# Patient Record
Sex: MALE | Race: AMERICAN INDIAN OR ALASKA NATIVE | NOT HISPANIC OR LATINO | Employment: FULL TIME | URBAN - METROPOLITAN AREA
[De-identification: names, ages, dates, MRNs, and addresses within clinical notes are randomized per-mention and may not be internally consistent; named-entity substitution may affect disease eponyms.]

---

## 2018-08-27 ENCOUNTER — HOSPITAL ENCOUNTER (OUTPATIENT)
Facility: HOSPITAL | Age: 48
Setting detail: OBSERVATION
Discharge: HOME | End: 2018-08-28
Attending: EMERGENCY MEDICINE | Admitting: STUDENT IN AN ORGANIZED HEALTH CARE EDUCATION/TRAINING PROGRAM

## 2018-08-27 DIAGNOSIS — F17.200 NICOTINE DEPENDENCE: ICD-10-CM

## 2018-08-27 DIAGNOSIS — R55 NEAR SYNCOPE: Primary | ICD-10-CM

## 2018-08-27 DIAGNOSIS — R53.1 GENERALIZED WEAKNESS: ICD-10-CM

## 2018-08-27 LAB
BASOPHILS # BLD AUTO: 0.05 THOUSANDS/ΜL (ref 0–0.1)
BASOPHILS NFR BLD AUTO: 0 % (ref 0–1)
EOSINOPHIL # BLD AUTO: 0.32 THOUSAND/ΜL (ref 0–0.61)
EOSINOPHIL NFR BLD AUTO: 2 % (ref 0–6)
ERYTHROCYTE [DISTWIDTH] IN BLOOD BY AUTOMATED COUNT: 14.1 % (ref 11.6–15.1)
GLUCOSE SERPL-MCNC: 85 MG/DL (ref 65–140)
HCT VFR BLD AUTO: 38.7 % (ref 36.5–49.3)
HGB BLD-MCNC: 12.7 G/DL (ref 12–17)
IMM GRANULOCYTES # BLD AUTO: 0.06 THOUSAND/UL (ref 0–0.2)
IMM GRANULOCYTES NFR BLD AUTO: 0 % (ref 0–2)
LYMPHOCYTES # BLD AUTO: 3.96 THOUSANDS/ΜL (ref 0.6–4.47)
LYMPHOCYTES NFR BLD AUTO: 25 % (ref 14–44)
MCH RBC QN AUTO: 28.5 PG (ref 26.8–34.3)
MCHC RBC AUTO-ENTMCNC: 32.8 G/DL (ref 31.4–37.4)
MCV RBC AUTO: 87 FL (ref 82–98)
MONOCYTES # BLD AUTO: 1.07 THOUSAND/ΜL (ref 0.17–1.22)
MONOCYTES NFR BLD AUTO: 7 % (ref 4–12)
NEUTROPHILS # BLD AUTO: 10.49 THOUSANDS/ΜL (ref 1.85–7.62)
NEUTS SEG NFR BLD AUTO: 66 % (ref 43–75)
NRBC BLD AUTO-RTO: 0 /100 WBCS
PLATELET # BLD AUTO: 293 THOUSANDS/UL (ref 149–390)
PMV BLD AUTO: 9.5 FL (ref 8.9–12.7)
RBC # BLD AUTO: 4.45 MILLION/UL (ref 3.88–5.62)
WBC # BLD AUTO: 15.95 THOUSAND/UL (ref 4.31–10.16)

## 2018-08-27 PROCEDURE — 80053 COMPREHEN METABOLIC PANEL: CPT | Performed by: EMERGENCY MEDICINE

## 2018-08-27 PROCEDURE — 82550 ASSAY OF CK (CPK): CPT | Performed by: EMERGENCY MEDICINE

## 2018-08-27 PROCEDURE — 84100 ASSAY OF PHOSPHORUS: CPT | Performed by: EMERGENCY MEDICINE

## 2018-08-27 PROCEDURE — 83735 ASSAY OF MAGNESIUM: CPT | Performed by: EMERGENCY MEDICINE

## 2018-08-27 PROCEDURE — 83615 LACTATE (LD) (LDH) ENZYME: CPT | Performed by: EMERGENCY MEDICINE

## 2018-08-27 PROCEDURE — 84484 ASSAY OF TROPONIN QUANT: CPT | Performed by: EMERGENCY MEDICINE

## 2018-08-27 PROCEDURE — 93005 ELECTROCARDIOGRAM TRACING: CPT

## 2018-08-27 PROCEDURE — 82948 REAGENT STRIP/BLOOD GLUCOSE: CPT

## 2018-08-27 PROCEDURE — 36415 COLL VENOUS BLD VENIPUNCTURE: CPT

## 2018-08-27 PROCEDURE — 85025 COMPLETE CBC W/AUTO DIFF WBC: CPT | Performed by: EMERGENCY MEDICINE

## 2018-08-27 PROCEDURE — 83625 ASSAY OF LDH ENZYMES: CPT | Performed by: EMERGENCY MEDICINE

## 2018-08-27 NOTE — LETTER
[address] [facility] [address] [id number]  Dept: [phone]    August 28, 2018     Patient: Vishnu Spain   YOB: 1970   Date of Visit: 8/27/2018       To Whom it May Concern:    Vishnu Spain was seen in the hospital from 8/27/2018  to 08/28/18  If you have any questions or concerns, please don't hesitate to call           Sincerely,          Rene Bernardo MD

## 2018-08-28 ENCOUNTER — APPOINTMENT (EMERGENCY)
Dept: RADIOLOGY | Facility: HOSPITAL | Age: 48
End: 2018-08-28

## 2018-08-28 ENCOUNTER — APPOINTMENT (OUTPATIENT)
Dept: NON INVASIVE DIAGNOSTICS | Facility: HOSPITAL | Age: 48
End: 2018-08-28

## 2018-08-28 ENCOUNTER — APPOINTMENT (OUTPATIENT)
Dept: RADIOLOGY | Facility: HOSPITAL | Age: 48
End: 2018-08-28

## 2018-08-28 VITALS
DIASTOLIC BLOOD PRESSURE: 66 MMHG | HEIGHT: 73 IN | TEMPERATURE: 98.5 F | BODY MASS INDEX: 22.76 KG/M2 | RESPIRATION RATE: 18 BRPM | WEIGHT: 171.74 LBS | OXYGEN SATURATION: 98 % | SYSTOLIC BLOOD PRESSURE: 117 MMHG | HEART RATE: 65 BPM

## 2018-08-28 PROBLEM — R55 NEAR SYNCOPE: Status: RESOLVED | Noted: 2018-08-28 | Resolved: 2018-08-28

## 2018-08-28 PROBLEM — R55 SYNCOPE: Status: ACTIVE | Noted: 2018-08-28

## 2018-08-28 PROBLEM — D72.829 LEUKOCYTOSIS: Status: ACTIVE | Noted: 2018-08-28

## 2018-08-28 PROBLEM — A08.4 VIRAL GASTROENTERITIS: Status: RESOLVED | Noted: 2018-08-28 | Resolved: 2018-08-28

## 2018-08-28 PROBLEM — E11.9 DM (DIABETES MELLITUS) (HCC): Status: ACTIVE | Noted: 2018-08-28

## 2018-08-28 PROBLEM — A08.4 VIRAL GASTROENTERITIS: Status: ACTIVE | Noted: 2018-08-28

## 2018-08-28 LAB
ALBUMIN SERPL BCP-MCNC: 3.7 G/DL (ref 3.5–5)
ALP SERPL-CCNC: 66 U/L (ref 46–116)
ALT SERPL W P-5'-P-CCNC: 19 U/L (ref 12–78)
AMPHETAMINES SERPL QL SCN: NEGATIVE
ANION GAP SERPL CALCULATED.3IONS-SCNC: 5 MMOL/L (ref 4–13)
ANION GAP SERPL CALCULATED.3IONS-SCNC: 5 MMOL/L (ref 4–13)
AST SERPL W P-5'-P-CCNC: 15 U/L (ref 5–45)
ATRIAL RATE: 70 BPM
BARBITURATES UR QL: NEGATIVE
BASOPHILS # BLD AUTO: 0.05 THOUSANDS/ΜL (ref 0–0.1)
BASOPHILS NFR BLD AUTO: 0 % (ref 0–1)
BENZODIAZ UR QL: NEGATIVE
BILIRUB SERPL-MCNC: 0.3 MG/DL (ref 0.2–1)
BILIRUB UR QL STRIP: NEGATIVE
BUN SERPL-MCNC: 11 MG/DL (ref 5–25)
BUN SERPL-MCNC: 9 MG/DL (ref 5–25)
CALCIUM SERPL-MCNC: 8.8 MG/DL (ref 8.3–10.1)
CALCIUM SERPL-MCNC: 9.1 MG/DL (ref 8.3–10.1)
CHEST PAIN STATEMENT: NORMAL
CHLORIDE SERPL-SCNC: 102 MMOL/L (ref 100–108)
CHLORIDE SERPL-SCNC: 103 MMOL/L (ref 100–108)
CHOLEST SERPL-MCNC: 76 MG/DL (ref 50–200)
CK SERPL-CCNC: 78 U/L (ref 39–308)
CLARITY UR: CLEAR
CO2 SERPL-SCNC: 31 MMOL/L (ref 21–32)
CO2 SERPL-SCNC: 32 MMOL/L (ref 21–32)
COCAINE UR QL: NEGATIVE
COLOR UR: YELLOW
CREAT SERPL-MCNC: 0.83 MG/DL (ref 0.6–1.3)
CREAT SERPL-MCNC: 0.88 MG/DL (ref 0.6–1.3)
EOSINOPHIL # BLD AUTO: 0.3 THOUSAND/ΜL (ref 0–0.61)
EOSINOPHIL NFR BLD AUTO: 2 % (ref 0–6)
ERYTHROCYTE [DISTWIDTH] IN BLOOD BY AUTOMATED COUNT: 14.3 % (ref 11.6–15.1)
ETHANOL SERPL-MCNC: <3 MG/DL (ref 0–3)
GFR SERPL CREATININE-BSD FRML MDRD: 102 ML/MIN/1.73SQ M
GFR SERPL CREATININE-BSD FRML MDRD: 105 ML/MIN/1.73SQ M
GLUCOSE P FAST SERPL-MCNC: 83 MG/DL (ref 65–99)
GLUCOSE SERPL-MCNC: 83 MG/DL (ref 65–140)
GLUCOSE SERPL-MCNC: 87 MG/DL (ref 65–140)
GLUCOSE SERPL-MCNC: 95 MG/DL (ref 65–140)
GLUCOSE SERPL-MCNC: 95 MG/DL (ref 65–140)
GLUCOSE SERPL-MCNC: 96 MG/DL (ref 65–140)
GLUCOSE UR STRIP-MCNC: NEGATIVE MG/DL
HCT VFR BLD AUTO: 39.7 % (ref 36.5–49.3)
HDLC SERPL-MCNC: 26 MG/DL (ref 40–60)
HGB BLD-MCNC: 12.7 G/DL (ref 12–17)
HGB UR QL STRIP.AUTO: NEGATIVE
IMM GRANULOCYTES # BLD AUTO: 0.07 THOUSAND/UL (ref 0–0.2)
IMM GRANULOCYTES NFR BLD AUTO: 0 % (ref 0–2)
KETONES UR STRIP-MCNC: NEGATIVE MG/DL
LDLC SERPL CALC-MCNC: 42 MG/DL (ref 0–100)
LEUKOCYTE ESTERASE UR QL STRIP: NEGATIVE
LYMPHOCYTES # BLD AUTO: 2.85 THOUSANDS/ΜL (ref 0.6–4.47)
LYMPHOCYTES NFR BLD AUTO: 18 % (ref 14–44)
MAGNESIUM SERPL-MCNC: 2 MG/DL (ref 1.6–2.6)
MAX DIASTOLIC BP: 82 MMHG
MAX HEART RATE: 144 BPM
MAX PREDICTED HEART RATE: 173 BPM
MAX. SYSTOLIC BP: 168 MMHG
MCH RBC QN AUTO: 28.1 PG (ref 26.8–34.3)
MCHC RBC AUTO-ENTMCNC: 32 G/DL (ref 31.4–37.4)
MCV RBC AUTO: 88 FL (ref 82–98)
METHADONE UR QL: NEGATIVE
MONOCYTES # BLD AUTO: 1.07 THOUSAND/ΜL (ref 0.17–1.22)
MONOCYTES NFR BLD AUTO: 7 % (ref 4–12)
NEUTROPHILS # BLD AUTO: 11.65 THOUSANDS/ΜL (ref 1.85–7.62)
NEUTS SEG NFR BLD AUTO: 73 % (ref 43–75)
NITRITE UR QL STRIP: NEGATIVE
NONHDLC SERPL-MCNC: 50 MG/DL
NRBC BLD AUTO-RTO: 0 /100 WBCS
OPIATES UR QL SCN: NEGATIVE
P AXIS: 24 DEGREES
PCP UR QL: NEGATIVE
PH UR STRIP.AUTO: 6.5 [PH] (ref 5–9)
PHOSPHATE SERPL-MCNC: 4.4 MG/DL (ref 2.7–4.5)
PLATELET # BLD AUTO: 284 THOUSANDS/UL (ref 149–390)
PMV BLD AUTO: 9.7 FL (ref 8.9–12.7)
POTASSIUM SERPL-SCNC: 3.6 MMOL/L (ref 3.5–5.3)
POTASSIUM SERPL-SCNC: 3.8 MMOL/L (ref 3.5–5.3)
PR INTERVAL: 216 MS
PROT SERPL-MCNC: 7.2 G/DL (ref 6.4–8.2)
PROT UR STRIP-MCNC: NEGATIVE MG/DL
PROTOCOL NAME: NORMAL
QRS AXIS: 16 DEGREES
QRSD INTERVAL: 86 MS
QT INTERVAL: 380 MS
QTC INTERVAL: 410 MS
RBC # BLD AUTO: 4.52 MILLION/UL (ref 3.88–5.62)
SODIUM SERPL-SCNC: 138 MMOL/L (ref 136–145)
SODIUM SERPL-SCNC: 140 MMOL/L (ref 136–145)
SP GR UR STRIP.AUTO: 1.02 (ref 1–1.03)
T WAVE AXIS: 32 DEGREES
TARGET HR FORMULA: NORMAL
TEST INDICATION: NORMAL
THC UR QL: POSITIVE
TIME IN EXERCISE PHASE: NORMAL
TRIGL SERPL-MCNC: 40 MG/DL
TROPONIN I SERPL-MCNC: <0.02 NG/ML
TSH SERPL DL<=0.05 MIU/L-ACNC: 0.89 UIU/ML (ref 0.36–3.74)
UROBILINOGEN UR QL STRIP.AUTO: 0.2 E.U./DL
VENTRICULAR RATE: 70 BPM
WBC # BLD AUTO: 15.99 THOUSAND/UL (ref 4.31–10.16)

## 2018-08-28 PROCEDURE — 87081 CULTURE SCREEN ONLY: CPT | Performed by: STUDENT IN AN ORGANIZED HEALTH CARE EDUCATION/TRAINING PROGRAM

## 2018-08-28 PROCEDURE — 70450 CT HEAD/BRAIN W/O DYE: CPT

## 2018-08-28 PROCEDURE — 99245 OFF/OP CONSLTJ NEW/EST HI 55: CPT | Performed by: INTERNAL MEDICINE

## 2018-08-28 PROCEDURE — 99219 PR INITIAL OBSERVATION CARE/DAY 50 MINUTES: CPT | Performed by: STUDENT IN AN ORGANIZED HEALTH CARE EDUCATION/TRAINING PROGRAM

## 2018-08-28 PROCEDURE — 80320 DRUG SCREEN QUANTALCOHOLS: CPT | Performed by: EMERGENCY MEDICINE

## 2018-08-28 PROCEDURE — 99285 EMERGENCY DEPT VISIT HI MDM: CPT

## 2018-08-28 PROCEDURE — 85025 COMPLETE CBC W/AUTO DIFF WBC: CPT | Performed by: STUDENT IN AN ORGANIZED HEALTH CARE EDUCATION/TRAINING PROGRAM

## 2018-08-28 PROCEDURE — 93017 CV STRESS TEST TRACING ONLY: CPT

## 2018-08-28 PROCEDURE — 36415 COLL VENOUS BLD VENIPUNCTURE: CPT | Performed by: EMERGENCY MEDICINE

## 2018-08-28 PROCEDURE — 70498 CT ANGIOGRAPHY NECK: CPT

## 2018-08-28 PROCEDURE — 82948 REAGENT STRIP/BLOOD GLUCOSE: CPT

## 2018-08-28 PROCEDURE — 80307 DRUG TEST PRSMV CHEM ANLYZR: CPT | Performed by: EMERGENCY MEDICINE

## 2018-08-28 PROCEDURE — 71046 X-RAY EXAM CHEST 2 VIEWS: CPT

## 2018-08-28 PROCEDURE — 93010 ELECTROCARDIOGRAM REPORT: CPT | Performed by: INTERNAL MEDICINE

## 2018-08-28 PROCEDURE — 84484 ASSAY OF TROPONIN QUANT: CPT | Performed by: STUDENT IN AN ORGANIZED HEALTH CARE EDUCATION/TRAINING PROGRAM

## 2018-08-28 PROCEDURE — 93306 TTE W/DOPPLER COMPLETE: CPT | Performed by: INTERNAL MEDICINE

## 2018-08-28 PROCEDURE — 80048 BASIC METABOLIC PNL TOTAL CA: CPT | Performed by: STUDENT IN AN ORGANIZED HEALTH CARE EDUCATION/TRAINING PROGRAM

## 2018-08-28 PROCEDURE — 84484 ASSAY OF TROPONIN QUANT: CPT | Performed by: EMERGENCY MEDICINE

## 2018-08-28 PROCEDURE — 93306 TTE W/DOPPLER COMPLETE: CPT

## 2018-08-28 PROCEDURE — 93018 CV STRESS TEST I&R ONLY: CPT | Performed by: INTERNAL MEDICINE

## 2018-08-28 PROCEDURE — 99217 PR OBSERVATION CARE DISCHARGE MANAGEMENT: CPT | Performed by: INTERNAL MEDICINE

## 2018-08-28 PROCEDURE — 84443 ASSAY THYROID STIM HORMONE: CPT | Performed by: INTERNAL MEDICINE

## 2018-08-28 PROCEDURE — 70496 CT ANGIOGRAPHY HEAD: CPT

## 2018-08-28 PROCEDURE — 99245 OFF/OP CONSLTJ NEW/EST HI 55: CPT | Performed by: PSYCHIATRY & NEUROLOGY

## 2018-08-28 PROCEDURE — 93016 CV STRESS TEST SUPVJ ONLY: CPT | Performed by: INTERNAL MEDICINE

## 2018-08-28 PROCEDURE — 80061 LIPID PANEL: CPT | Performed by: INTERNAL MEDICINE

## 2018-08-28 PROCEDURE — 81003 URINALYSIS AUTO W/O SCOPE: CPT | Performed by: EMERGENCY MEDICINE

## 2018-08-28 RX ORDER — HEPARIN SODIUM 5000 [USP'U]/ML
5000 INJECTION, SOLUTION INTRAVENOUS; SUBCUTANEOUS EVERY 8 HOURS SCHEDULED
Status: DISCONTINUED | OUTPATIENT
Start: 2018-08-28 | End: 2018-08-28 | Stop reason: HOSPADM

## 2018-08-28 RX ORDER — SODIUM CHLORIDE 9 MG/ML
50 INJECTION, SOLUTION INTRAVENOUS CONTINUOUS
Status: DISCONTINUED | OUTPATIENT
Start: 2018-08-28 | End: 2018-08-28

## 2018-08-28 RX ORDER — SODIUM CHLORIDE 9 MG/ML
100 INJECTION, SOLUTION INTRAVENOUS CONTINUOUS
Status: DISCONTINUED | OUTPATIENT
Start: 2018-08-28 | End: 2018-08-28 | Stop reason: HOSPADM

## 2018-08-28 RX ADMIN — IOHEXOL 85 ML: 350 INJECTION, SOLUTION INTRAVENOUS at 01:36

## 2018-08-28 RX ADMIN — HEPARIN SODIUM 5000 UNITS: 5000 INJECTION, SOLUTION INTRAVENOUS; SUBCUTANEOUS at 13:24

## 2018-08-28 RX ADMIN — SODIUM CHLORIDE 50 ML/HR: 0.9 INJECTION, SOLUTION INTRAVENOUS at 02:11

## 2018-08-28 RX ADMIN — HEPARIN SODIUM 5000 UNITS: 5000 INJECTION, SOLUTION INTRAVENOUS; SUBCUTANEOUS at 05:35

## 2018-08-28 RX ADMIN — SODIUM CHLORIDE 100 ML/HR: 0.9 INJECTION, SOLUTION INTRAVENOUS at 09:04

## 2018-08-28 NOTE — H&P
History and Physical - [facility] Internal Medicine    Patient Information: Francis Parra 53 y o  male MRN: [id number]  Unit/Bed#: ED 08 Encounter: [id number]  Admitting Physician: Damián Major MD  PCP: No primary care provider on file  Date of Admission:  08/28/18    Chief Complaint:     Dizziness, near syncope, abdominal pain, diarrhea   of Present Illness:    Francis Parra is a 53 y o  male with a PMH of DM who presents with dizziness, abdominal pain and diarrhea  He states that he was in his usual state of health until 2 days ago when he noticed some dizziness and nausea  This became more severe and he wound up falling down but not losing consciousness  He states that his body "just gave out"  This has never happened before  This was witnessed by his family who denied any seizure-like activity  Afterwards he regained his strength however he began to have midabdominal discomfort and loose stools  He also continued to have intermittent dizziness  Today he states that the dizziness became much worse and almost passed out  He denies any focal weakness/numbness/tingling, slurred speech or difficulty talking  Currently he reports mild abdominal discomfort  No other complaints or concerns  Review of Systems:    Review of Systems   Constitutional: Negative for fever  Respiratory: Negative for shortness of breath  Cardiovascular: Negative for chest pain and leg swelling  Gastrointestinal: Positive for abdominal pain, diarrhea and nausea  Negative for vomiting  Genitourinary: Negative for hematuria  Musculoskeletal: Negative for arthralgias  Neurological: Positive for dizziness and weakness  Negative for facial asymmetry, speech difficulty and numbness         Past Medical and Surgical History:     Past Medical History:   Diagnosis Date    Diabetes mellitus ([facility] [address] )        Past Surgical History:   Procedure Laterality Date    TONSILLECTOMY         Meds/Allergies:    PTA meds:   None Allergies: No Known Allergies  History:     Marital Status: Single   History   Alcohol Use No     History   Smoking Status    Current Every Day Smoker    Packs/day: 0 50   Smokeless Tobacco    Never Used     History   Drug Use    Frequency: 2 0 times per week    Types: Marijuana       Family History: Mother: DM, Pulmonary HTN  Father: DM    Physical Exam:     Vitals:   Blood Pressure: 121/74 (08/28/18 0133)  Pulse: 62 (08/28/18 0133)  Temperature: 98 1 °F (36 7 °C) (08/27/18 2341)  Temp Source: Oral (08/27/18 2341)  Respirations: 18 (08/28/18 0133)  Height: 6' 1" (185 4 cm) (08/27/18 2341)  Weight - Scale: 78 5 kg (173 lb) (08/27/18 2341)  SpO2: 100 % (08/28/18 0133)    Physical Exam:   General: in no acute distress  HEENT: atraumatic, normocephalic  Skin: no jaundice  CVS: RRR, no murmurs appreciated  Lungs: CTAL, no wheezing or rales appreciated  Abdomen: soft, nondistended, bowel sounds normal, nontender upon palpation, no guarding or rebound tenderness  Extremities: no edema, no calf swelling or tenderness  Neuro: alert and oriented x3, normal handgrip strength bilaterally, sensation intact in all extremities, muscle strength 5/5 in all extremities, follows commands  Psych: calm, cooperative      Lab Results: I have personally reviewed pertinent reports  Results from last 7 days  Lab Units 08/27/18  2347   WBC Thousand/uL 15 95*   HEMOGLOBIN g/dL 12 7   HEMATOCRIT % 38 7   PLATELETS Thousands/uL 293   NEUTROS PCT % 66   LYMPHS PCT % 25   MONOS PCT % 7   EOS PCT % 2       Results from last 7 days  Lab Units 08/27/18  2347   SODIUM mmol/L 138   POTASSIUM mmol/L 3 6   CHLORIDE mmol/L 102   CO2 mmol/L 31   BUN mg/dL 11   CREATININE mg/dL 0 88   CALCIUM mg/dL 8 8   ALK PHOS U/L 66   ALT U/L 19   AST U/L 15           Imaging:     Ct Head Without Contrast    Result Date: 8/28/2018  Narrative: CT BRAIN - WITHOUT CONTRAST INDICATION:   Syncope/fainting  COMPARISON:  None   TECHNIQUE:  CT examination of the brain was performed  In addition to axial images, coronal 2D reformatted images were created and submitted for interpretation  Radiation dose length product (DLP) for this visit:  1242 11 mGy-cm   This examination, like all CT scans performed in the [facility], was performed utilizing techniques to minimize radiation dose exposure, including the use of iterative reconstruction and automated exposure control  IMAGE QUALITY:  Diagnostic  FINDINGS: PARENCHYMA:  No intracranial mass, mass effect or midline shift  No CT signs of acute infarction  No acute parenchymal hemorrhage  VENTRICLES AND EXTRA-AXIAL SPACES:  No hydrocephalus or extra-axial collection  VISUALIZED ORBITS AND PARANASAL SINUSES:  No retrobulbar hematoma  No paranasal sinus disease  CALVARIUM AND EXTRACRANIAL SOFT TISSUES:  No acute calvarial fracture, lytic or blastic lesion or soft tissue mass  Impression: No acute intracranial abnormality  Workstation performed: [id number]         Assessment/Plan    * Near syncope   Assessment & Plan    CT head is negative for an acute process  Can be secondary to dehydration from volume loss/diarrhea though initial symptoms began prior to onset but have persisted  Will admit for Observation, monitor on telemetry, trend troponin, check Orthostatics, order TTE and have Cardiology and Neurology evaluate the pt         Viral gastroenteritis   Assessment & Plan    Likely secondary to viral gastroenteritis as he reports mild abdominal discomfort, diarrhea and recent sick contact with similar complaints  Will order stool studies and hydrate gently with IV NS        Leukocytosis   Assessment & Plan    Likely due to above    Will repeat CBC later today         DM (diabetes mellitus) ([facility] [address] )   Assessment & Plan    No results found for: HGBA1C    Recent Labs      08/27/18   2340   POCGLU  85       Blood Sugar Average: Last 72 hrs:  (P) 85     Will place on MICHI ORTEGA [facility] Problem List: Principal Problem:    Near syncope  Active Problems:    Viral gastroenteritis    Leukocytosis    DM (diabetes mellitus) (HCC)        VTE Prophylaxis: Heparin   Code Status: No Order    Anticipated Length of Stay:  Patient will be admitted on an Observation basis with an anticipated length of stay of atleast 1 midnights  Total Time for Visit, including Counseling / Coordination of Care: 30 minutes  Greater than 50% of this total time spent on direct patient counseling and coordination of care

## 2018-08-28 NOTE — DISCHARGE SUMMARY
Discharge Summary - [facility] [facility] Medicine    Patient Information: Vishnu Spain 53 y o  male MRN: [id number]  Unit/Bed#: [address] [address] Encounter: [id number]    Discharging Physician / Practitioner: Rene Bernardo MD  PCP: No primary care provider on file  Admission Date: 8/27/2018  Discharge Date: 08/28/18    Reason for Admission: Weakness - Generalized (PAtient rpeorts generalized weakness and dizziness X 3days  Patient reports that he had a syncopal episode on saturday, patient states that he felt the same way yesterday he sat on the floor "and I went limp but no LOC " Patient states he started to feel the same way today so he decided to come to the ER   )      Discharge Diagnoses:     Principal Problem (Resolved):    Near syncope  Active Problems:    Leukocytosis    DM (diabetes mellitus) ([facility] )  Resolved Problems:    Viral gastroenteritis        DM (diabetes mellitus) ([facility] )   Assessment & Plan    No results found for: HGBA1C    Recent Labs      08/27/18   2340  08/28/18   0710  08/28/18   1142   POCGLU  85  95  95       Blood Sugar Average: Last 72 hrs:  (P) 85     Acceptable  Follow-up PMD        Leukocytosis   Assessment & Plan    Likely reactive related to gastroenteritis   Patient is afebrile hemodynamically stable  Abdominal exam is benign  Tolerating diet  No further nausea vomiting or diarrhea  Advised to monitor symptoms  Advised to follow up with PMD in 1-2 weeks for repeat CBC  Advised to seek medical attention if recurrence of symptoms        Viral gastroenteritisresolved as of 8/28/2018   Assessment & Plan    Possibly secondary to gastroenteritis as he reports mild abdominal discomfort, diarrhea and recent sick contact with similar complaints     Clinically improving  No further nausea vomiting or abdominal discomfort  Tolerating diet  Abdominal exam benign   monitor clinically, supportive care  Advised to seek medical attention if recurrence of GI symptoms          * Near syncoperesolved as of 8/28/2018   Assessment & Plan    CT head is negative for an acute process  Likely secondary secondary to vasovagal in setting of volume depletion  Noted to have low normal blood pressure  No orthostasis at the time of admission, but negative subsequently by after IV hydration  CTA head and neck without any acute abnormality  2D echo with normal EF without significant valvular abnormality  Telemetry is without any arrhythmia  Symptomatic improved  Tolerating diet  Neuro exam nonfocal, abdominal exam benign  Advised adequate p o  intake and hydration  Pineda stockings  Follow-up pending TSH and lipid profile  Neurology and Cardiology input appreciated            Consultations During Hospital Stay:  [address] [facility] TO CARDIOLOGY    Procedures Performed:     · Orthostasis- negative after hydration    · 2D echo  SUMMARY     LEFT VENTRICLE:  Systolic function was normal  Ejection fraction was estimated in the range of 55 % to 60 %  There were no regional wall motion abnormalities  There was mild borderline concentric hypertrophy      MITRAL VALVE:  There was trace regurgitation      TRICUSPID VALVE:  There was mild regurgitation  Estimated peak PA pressure was 25 mmHg    Significant Findings:     · U tox positive for cannabis    Imaging while in hospital:    Cta Head And Neck With And Without Contrast    Result Date: 8/28/2018  Narrative: CTA NECK AND BRAIN WITH AND WITHOUT CONTRAST INDICATION: Headache, sudden, carotid/vertebral dissection suspected COMPARISON:   None  TECHNIQUE:  Routine CT imaging of the Brain without contrast   Post contrast imaging was performed after administration of iodinated contrast through the neck and brain  Post contrast axial 0 625 mm images timed to opacify the arterial system  3D rendering was performed on an independent workstation  MIP reconstructions performed  Coronal reconstructions were performed of the noncontrast portion of the brain    Radiation dose length product (DLP) for this visit:  420 42 mGy-cm   This examination, like all CT scans performed in the [facility], was performed utilizing techniques to minimize radiation dose exposure, including the use of iterative  reconstruction and automated exposure control  IV Contrast:  85 mL of iohexol (OMNIPAQUE)  IMAGE QUALITY:   Diagnostic FINDINGS: Noncontrast CT head was performed prior to this examination and is reported separately CERVICAL VASCULATURE AORTIC ARCH AND GREAT VESSELS:  Normal aortic arch and great vessel origins  Normal visualized subclavian vessels  RIGHT VERTEBRAL ARTERY CERVICAL SEGMENT:  Mild atherosclerotic plaque at the origin  The vessel is normal in caliber throughout the neck  LEFT VERTEBRAL ARTERY CERVICAL SEGMENT:  Left vertebral artery takes off from the aortic arch  The vessel is normal in caliber throughout the neck  RIGHT EXTRACRANIAL CAROTID SEGMENT:  Normal caliber common carotid artery  Normal bifurcation and cervical internal carotid artery  No stenosis or dissection  LEFT EXTRACRANIAL CAROTID SEGMENT:  Normal caliber common carotid artery  Normal bifurcation and cervical internal carotid artery  No stenosis or dissection  NASCET criteria was used to determine the degree of internal carotid artery diameter stenosis  INTRACRANIAL VASCULATURE INTERNAL CAROTID ARTERIES:  Normal enhancement of the intracranial portions of the internal carotid arteries  Normal ophthalmic artery origins  Normal ICA terminus  ANTERIOR CIRCULATION:  Symmetric A1 segments and anterior cerebral arteries with normal enhancement  Normal anterior communicating artery  MIDDLE CEREBRAL ARTERY CIRCULATION:  M1 segment and middle cerebral artery branches demonstrate normal enhancement bilaterally  DISTAL VERTEBRAL ARTERIES:  Normal distal vertebral arteries  Posterior inferior cerebellar artery origins are normal  Normal vertebral basilar junction   BASILAR ARTERY:  Basilar artery is normal in caliber  Normal superior cerebellar arteries  POSTERIOR CEREBRAL ARTERIES: Both posterior cerebral arteries arises from the basilar tip  Both arteries demonstrate normal enhancement  Normal posterior communicating arteries  DURAL VENOUS SINUSES:  Normal  NON VASCULAR ANATOMY BONY STRUCTURES:  No acute osseous abnormality  SOFT TISSUES OF THE NECK:  Mild adenoidal hypertrophy is noted, likely secondary to infectious or inflammatory etiology  THORACIC INLET:  4 mm intrafissural node is incidentally noted at the right upper lobe  Impression: No evidence of large aneurysm, dissection, significant stenosis or large vessel occlusion throughout the head and neck Workstation performed: [id number]     X-ray Chest 2 Views    Result Date: 8/28/2018  Narrative: CHEST INDICATION:   chest pain  COMPARISON:  None EXAM PERFORMED/VIEWS:  XR CHEST PA & LATERAL FINDINGS: Apices not in the field-of-view  Cardiomediastinal silhouette appears unremarkable  The lungs are clear  No pneumothorax or pleural effusion  Osseous structures appear within normal limits for patient age  Impression: Apices poorly visualized  No acute cardiopulmonary disease  Workstation performed: [id number]     Ct Head Without Contrast    Result Date: 8/28/2018  Narrative: CT BRAIN - WITHOUT CONTRAST INDICATION:   Syncope/fainting  COMPARISON:  None  TECHNIQUE:  CT examination of the brain was performed  In addition to axial images, coronal 2D reformatted images were created and submitted for interpretation  Radiation dose length product (DLP) for this visit:  1242 11 mGy-cm   This examination, like all CT scans performed in the [facility], was performed utilizing techniques to minimize radiation dose exposure, including the use of iterative reconstruction and automated exposure control  IMAGE QUALITY:  Diagnostic  FINDINGS: PARENCHYMA:  No intracranial mass, mass effect or midline shift  No CT signs of acute infarction  No acute parenchymal hemorrhage  VENTRICLES AND EXTRA-AXIAL SPACES:  No hydrocephalus or extra-axial collection  VISUALIZED ORBITS AND PARANASAL SINUSES:  No retrobulbar hematoma  No paranasal sinus disease  CALVARIUM AND EXTRACRANIAL SOFT TISSUES:  No acute calvarial fracture, lytic or blastic lesion or soft tissue mass  Impression: No acute intracranial abnormality  Workstation performed: [id number]       Incidental Findings:   · None    Test Results Pending at Discharge (will require follow up):   · As per After Visit Summary     Outpatient Tests Requested:  · CBC and blood pressure monitoring    Complications:  None    Hospital Course:     Byron Ribeiro is a 53 y o  male patient with past medical history of diet-controlled diabetes mellitus type 2  who originally presented to the hospital on 8/27/2018 due to episode of dizziness and near syncope  Patient reported that 2 days ago he noticed to have some abdominal discomfort and while getting up to use the bathroom he got dizzy and almost passed out  She felt generally weak  Subsequently he continued to have GI symptoms with nausea and loose bowel movement  Next day, he continued to have abdominal uneasiness and poor p o  Intake  Patient reported continued dizziness and felt like passing out on standing up  Denied any focal weakness numbness speech or visual abnormality  Due to persistent symptoms he presented to emergency room and admitted for further evaluation workup  Please refer to H&P from earlier today for further information  Patient remained clinically asymptomatic during hospitalization  Patient did have episode of low normal blood pressure  Received IV fluid and no orthostasis was available at the time of presentation but subsequently orthostasis were negative  Patient did not have any further GI symptoms during the hospitalization and tolerated diet  Cardiac workup was done as above  Patient was seen by Cardiology and Neurology  Clinically remained stable and was discharged home  Patient was advised to maintain adequate p o  intake and hydration  Pineda stockings were advised for which patient declined prescription as he reported that he would be able to get it with the help of the family member  Patient was educated the diagnosis treatment and follow-up plan  Patient was advised to seek medical attention in event of recurrence of the symptoms  Please see above list of diagnoses and related plan for additional information  Condition at Discharge: stable     Discharge Day Visit / Exam:     Subjective:  Feels much better  Denies any nausea vomiting abdominal pain or diarrhea  Tolerating diet  Denies any dizziness    Vitals: Blood Pressure: 117/66 (08/28/18 1222)  Pulse: 65 (08/28/18 1222)  Temperature: 98 5 °F (36 9 °C) (08/28/18 1222)  Temp Source: Temporal (08/28/18 1222)  Respirations: 18 (08/28/18 1222)  Height: 6' 1" (185 4 cm) (08/27/18 2341)  Weight - Scale: 77 9 kg (171 lb 11 8 oz) (08/28/18 0154)  SpO2: 98 % (08/28/18 1222)  Exam:   Physical Exam   Constitutional: He is oriented to person, place, and time  He appears well-developed  No distress  HENT:   Head: Normocephalic and atraumatic  Nose: Nose normal    Mouth/Throat: Oropharynx is clear and moist    Eyes: Conjunctivae and EOM are normal  Pupils are equal, round, and reactive to light  Neck: Normal range of motion  Neck supple  No JVD present  Cardiovascular: Normal rate, regular rhythm and normal heart sounds  Exam reveals no gallop and no friction rub  No murmur heard  Pulmonary/Chest: Effort normal and breath sounds normal  No respiratory distress  He has no wheezes  He has no rales  He exhibits no tenderness  Abdominal: Soft  Bowel sounds are normal  He exhibits no distension and no pulsatile midline mass  There is no hepatosplenomegaly  There is no tenderness  There is no rebound, no guarding and no CVA tenderness     Musculoskeletal: Normal range of motion  He exhibits no edema  Neurological: He is alert and oriented to person, place, and time  He has normal strength  He displays normal reflexes  No cranial nerve deficit or sensory deficit  He exhibits normal muscle tone  Coordination normal  GCS eye subscore is 4  GCS verbal subscore is 5  GCS motor subscore is 6  Skin: Skin is warm and dry  No rash noted  Psychiatric: He has a normal mood and affect  Discharge instructions/Information to patient and family:(Discharge Medications and Follow up):   See after visit summary for information provided to patient and family  Provisions for Follow-Up Care:  See after visit summary for information related to follow-up care and any pertinent home health orders  Disposition: Home    Planned Readmission:  No     Discharge Statement:  I spent 50 minutes discharging the patient  This time was spent on the day of discharge  I had direct contact with the patient on the day of discharge  Greater than 50% of the total time was spent examining patient, answering all patient questions, arranging and discussing plan of care with patient as well as directly providing post-discharge instructions  Additional time then spent on discharge activities  Coordinated with Cardiology, Dr Karoline May and Neurology, Dr Garry Grover at the time of discharge  Discharge Medications:  See after visit summary for reconciled discharge medications provided to patient and family  ** Please Note:  Dictation voice to text software may have been used in the creation of this document   **

## 2018-08-28 NOTE — PLAN OF CARE
Problem: DISCHARGE PLANNING - CARE MANAGEMENT  Goal: Discharge to post-acute care or home with appropriate resources  INTERVENTIONS:  - Conduct assessment to determine patient/family and health care team treatment goals, and need for post-acute services based on payer coverage, community resources, and patient preferences, and barriers to discharge  - Address psychosocial, clinical, and financial barriers to discharge as identified in assessment in conjunction with the patient/family and health care team  - Arrange appropriate level of post-acute services according to patient's   needs and preference and payer coverage in collaboration with the physician and health care team  - Communicate with and update the patient/family, physician, and health care team regarding progress on the discharge plan  - Arrange appropriate transportation to post-acute venues  Return to home independently    Outcome: Progressing

## 2018-08-28 NOTE — NURSING NOTE
Received report from [facility], from ED  Patient arrived on unit via wheel chair  Awake and alert  Denies pain  No s/s of distress  Oriented patient to room, unit and staff

## 2018-08-28 NOTE — SOCIAL WORK
DASH discussion completed  Discussed goals of making sure pt's needs are met upon discharge, pt's preferences are taken into account, pt understands her health condition, medications and symptoms to watch for after returning home and pt is aware of any follow up appointments recommended by hospital physician  RENETTA spoke with the pt at the bedside  Pt noted that he lives with a brother and that he is independent with his own care  Pt denies DME or HHC  Pt works nights and not there long enough for insurance yet  He stated that he spoke with [employer] and will follow up with them post DC   Pt planning on using [employer] in [place], [place]

## 2018-08-28 NOTE — PLAN OF CARE
CARDIOVASCULAR - ADULT     Maintains optimal cardiac output and hemodynamic stability Progressing     Absence of cardiac dysrhythmias or at baseline rhythm Progressing        INFECTION - ADULT     Absence or prevention of progression during hospitalization Progressing     Absence of fever/infection during neutropenic period Progressing        Knowledge Deficit     Patient/family/caregiver demonstrates understanding of disease process, treatment plan, medications, and discharge instructions Progressing        METABOLIC, FLUID AND ELECTROLYTES - ADULT     Electrolytes maintained within normal limits Progressing     Fluid balance maintained Progressing     Glucose maintained within target range Progressing        PAIN - ADULT     Verbalizes/displays adequate comfort level or baseline comfort level Progressing        Potential for Falls     Patient will remain free of falls Progressing        SAFETY ADULT     Maintain or return to baseline ADL function Progressing     Maintain or return mobility status to optimal level Progressing

## 2018-08-28 NOTE — CONSULTS
[facility]   Neurology Initial Consult    Marylen Cobbs is a 53 y o  male  [address]          Information obtained from:   Chief Complaint   Patient presents with    Weakness - Generalized     PAtient rpeorts generalized weakness and dizziness X 3days  Patient reports that he had a syncopal episode on saturday, patient states that he felt the same way yesterday he sat on the floor "and I went limp but no LOC " Patient states he started to feel the same way today so he decided to come to the ER  Assessment/Plan:    1  Syncope   2  Dehydration   3  Viral gastroenteritis      Most likely etiology vasovagal from volume depletion in setting of current GI illness and poor intake  Discussed staying hydrated with fluids and electrolytes  His condition should be self limited  Continue IV hydration  If his sxs recur, he is asked to wear compressive stockings  HPI:  Marylen Cobbs is a 54 yo M with PMH of DM II that presents with cc of presyncopal events  First one was Sat night when he was on his way to bathroom  Before he could get to it, he felt like he was going to pass out  His younger brother saw this and caught him from falling down  He did have brief LOC  He did have nausea prior to it  After the incident, he vomited and had loose bowel movement  Sunday, again he was standing one time and felt as if he was going to black out  He feels his body just becomes weak  Yesterday he also felt lightheaded and decided to come for medical evaluation  He says that his job requires a lot of walking  Lately, he has a lot of stress and is not able to find time to eat or drink on time  Upon arrival, he did report GI discomfort  He does have leukocytosis with wbc in range of 15  There was no reported seizure like activity  There was no LOC, bowel, bladder incontinence  There was no associated chest pain, palpitations         Past Medical History:   Diagnosis Date    Diabetes mellitus ([facility] [address] )        Past Surgical History:   Procedure Laterality Date    TONSILLECTOMY         No Known Allergies      Current Facility-Administered Medications:     heparin (porcine) subcutaneous injection 5,000 Units, 5,000 Units, Subcutaneous, Q8H [facility], 5,000 Units at 08/28/18 1324 **AND** Platelet count, , , Once, Cristina Madrid MD    nicotine (NICODERM CQ) 7 mg/24hr TD 24 hr patch 1 patch, 1 patch, Transdermal, Daily, Cristina Madrid MD    sodium chloride 0 9 % infusion, 100 mL/hr, Intravenous, Continuous, Ayse [facility] MD, Last Rate: 100 mL/hr at 08/28/18 0904, 100 mL/hr at 08/28/18 1681    Social History     Social History    Marital status: Single     Spouse name: N/A    Number of children: N/A    Years of education: N/A     Occupational History    Not on file  Social History Main Topics    Smoking status: Current Every Day Smoker     Packs/day: 0 50    Smokeless tobacco: Never Used    Alcohol use No    Drug use: Yes     Frequency: 2 0 times per week     Types: Marijuana    Sexual activity: Not on file     Other Topics Concern    Not on file     Social History Narrative    No narrative on file       History reviewed  No pertinent family history  Review of systems:  Please see HPI for positive symptoms  No fever, no chills, no weight change  Ocular: No drainage, no blurred vision  HEENT:  No sore throat, earache, or congestion  No neck pain  COR:  No chest pain  No palpitations  Lungs:  no sob, wheezing,  GI:  no  nausea, no vomiting, no diarrhea, no constipation, no anorexia  :  No dysuria, frequency, or urgency  No hematuria  Musculoskeletal:  No joint pain or swelling or edema  Skin:  No rash or itching  Psychiatric:  no anxiety, no depression  Endocrine:  No polyuria or polydipsia  Physical examination:  Vitals:    08/28/18 1222   BP: 117/66   Pulse: 65   Resp: 18   Temp: 98 5 °F (36 9 °C)   SpO2: 98%       GENERAL APPEARANCE:  The patient is alert, oriented    He is in no acute distress  HEENT:  Head is normocephalic  The sinuses are otherwise nontender  Pupils are equal and reactive  NECK:  Supple without lymphadenopathy  HEART:  Regular rate and rhythm  LUNGS:  clear to auscultation  No crackles or wheezes are heard  ABDOMEN:  Soft, nontender, nondistended with good bowel sounds heard  EXTREMITIES:  Without cyanosis, clubbing or edema  Mental status: The patient is alert, attentive, and oriented  Speech is clear and fluent, good repetition, comprehension, and naming  he recalls 3/3 objects at 5 minutes  Cranial nerves:  CN II: Visual fields are full to confrontation  Fundoscopic exam is normal with sharp discs and no vascular changes    Pupils are 3 mm and  reactive to light  CN III, IV, VI: At primary gaze, there is no eye deviation  CN V: Facial sensation is intact to pinprick in all 3 divisions bilaterally  Corneal responses are intact  CN VII: Face is symmetric with normal eye closure and smile  CN VIII: Hearing is normal to rubbing fingers  CN IX, X: Palate elevates symmetrically  Phonation is normal   CN XI: Head turning and shoulder shrug are intact  CN XII: Tongue is midline with normal movements and no atrophy  Motor: There is no pronator drift of out-stretched arms  Muscle bulk and tone are normal      Muscle exam  Arm Right Left Leg Right Left   Deltoid 5/5 5/5 Iliopsoas 5/5 5/5   Biceps 5/5 5/5 Quads 5/5 5/5   Triceps 5/5 5/5 Hamstrings 5/5 5/5   Wrist Extension 5/5 5/5 Ankle Dorsi Flexion 5/5 5/5   Wrist Flexion 5/5 5/5 Ankle Plantar Flexion 5/5 5/5   Interossei 5/5 5/5 Ankle Eversion 5/5 5/5   APB 5/5 5/5 Ankle Inversion 5/5 5/5       Reflexes   RJ BJ TJ KJ AJ Plantars Suarez's   Right 2+ 2+ 2+ 2+ 2+ Downgoing Not present   Left 2+ 2+ 2+ 2+ 2+ Downgoing Not present     Sensory:  Light touch, pinprick, position sense, and vibration sense are intact in fingers and toes    Coordination:  Rapid alternating movements and fine finger movements are intact  There is no dysmetria on finger-to-nose and heel-knee-shin  There are no abnormal or extraneous movements  Romberg negative  Gait/Stance:  Normal heel/toe walking and tandem gait  Lab Results   Component Value Date    WBC 15 99 (H) 08/28/2018    HGB 12 7 08/28/2018    HCT 39 7 08/28/2018    MCV 88 08/28/2018     08/28/2018     No results found for: HGBA1C  Lab Results   Component Value Date    ALT 19 08/27/2018    AST 15 08/27/2018    ALKPHOS 66 08/27/2018     Lab Results   Component Value Date    CALCIUM 9 1 08/28/2018     08/28/2018    K 3 8 08/28/2018    CO2 32 08/28/2018     08/28/2018    BUN 9 08/28/2018    CREATININE 0 83 08/28/2018         Radiology          Review of reports and notes reveal:  X-ray Chest 2 Views    Result Date: 8/28/2018  Apices poorly visualized  No acute cardiopulmonary disease  Workstation performed: [id number]     Independent Interpretation of images or specimens:  Cta Head And Neck With And Without Contrast    Result Date: 8/28/2018  No evidence of large aneurysm, dissection, significant stenosis or large vessel occlusion throughout the head and neck Workstation performed: [id number]       Ct Head Without Contrast    Result Date: 8/28/2018  No acute intracranial abnormality  Workstation performed: [id number]             Thank you for this consult  Total time of encounter: 60 min   More than 50% of time was spent in counseling and coordination of care of patient  AMADA [facility]  [facility] [address]  [address]

## 2018-08-28 NOTE — CONSULTS
Consultation - Cardiology   Oumou Tanner 53 y o  male MRN: [id number]  Unit/Bed#: [address] [facility] 414-01 Encounter: [id number]    Assessment/Plan     Assessment:  1  Syncope  2  Leukocytosis  3  Viral gastroenteritis    Plan:  Patient has been admitted to the hospitalist service  1   Check orthostatic vital signs  2   Continue IV hydration with GI workup per primary team  3  Obtain 2D echocardiogram to evaluate cardiac function structure and wall motion and rule out valvular dysfunction  4   Exercise stress tests (non unclear) to evaluate for CardioNet vision  History of Present Illness   Physician Requesting Consult: Tena Stephenson MD  Reason for Consult / Principal Problem:   Syncope  HPI: Oumou Tanner is a 53y o  year old male who presents to the emergency room at [facility] with complaints of 3 day history of generalized weakness, dizziness, no energy and headache  He states initially on Saturday 08/25/2018 in the evening he was getting up to go to the bathroom, while he was walking he suddenly felt lightheaded, dizzy, had abdominal discomfort and tunnel vision  He felt as if he was speaking but he was not making any sense to his family  Patient stated he lost consciousness and awoke on the floor  He managed to crawl into the bathroom where he laid on the floor until he started to feel a little bit better  He then managed to get up and use the facility  He stated since that time he has just felt weak, no energy, with intermittent dizziness  He states once again on Sunday and then on Monday evening he had near syncopal episodes  He denies eating any food that was unusual or maybe eating out at a restaurant  He denied any fevers or chills  He denies any further episodes since admission to the hospital   Orthostatic vital signs were negative  Twelve lead EKG is unremarkable  Labs are also unremarkable except for elevated white count and also urine drug screen positive for THC  Inpatient consult to Cardiology  Consult performed by: Sai Baig ordered by: Lyndsey Moreno          Review of Systems   Constitutional: Positive for fatigue  Negative for activity change, appetite change and unexpected weight change  HENT: Positive for tinnitus  Negative for hearing loss, trouble swallowing and voice change  Eyes: Negative for photophobia and visual disturbance  tunnel vision at times of episodes   Respiratory: Negative for apnea, chest tightness, shortness of breath and wheezing  Cardiovascular: Positive for palpitations  Negative for chest pain and leg swelling  Gastrointestinal: Positive for abdominal pain and nausea  Negative for abdominal distention, diarrhea and vomiting  Endocrine: Negative for polydipsia, polyphagia and polyuria  Musculoskeletal: Negative  Skin: Negative  Neurological: Positive for dizziness, syncope, weakness, light-headedness, numbness and headaches  Negative for seizures and speech difficulty  Historical Information   Past Medical History:   Diagnosis Date    [facility]      Past Surgical History:   Procedure Laterality Date    TONSILLECTOMY       History   Alcohol Use No     History   Drug Use    Frequency: 2 0 times per week    Types: Marijuana     History   Smoking Status    Current Every Day Smoker    Packs/day: 0 50   Smokeless Tobacco    Never Used     Family History: History reviewed  No pertinent family history      Meds/Allergies   all current active meds have been reviewed, current meds:   Current Facility-Administered Medications   Medication Dose Route Frequency    heparin (porcine) subcutaneous injection 5,000 Units  5,000 Units Subcutaneous Q8H [address]    nicotine (NICODERM CQ) 7 mg/24hr TD 24 hr patch 1 patch  1 patch Transdermal Daily    sodium chloride 0 9 % infusion  100 mL/hr Intravenous Continuous    and PTA meds:   None     No Known Allergies    Objective   Vitals: Blood pressure 120/73, pulse 69, temperature (!) 97 4 °F (36 3 °C), temperature source Temporal, resp  rate 18, height 6' 1" (1 854 m), weight 77 9 kg (171 lb 11 8 oz), SpO2 100 %  Orthostatic Blood Pressures      Most Recent Value   Blood Pressure  120/73 filed at 08/28/2018 0171   Patient Position - Orthostatic VS  Standing - Orthostatic VS filed at 08/28/2018 0252            Intake/Output Summary (Last 24 hours) at 08/28/18 0352  Last data filed at 08/28/18 0601   Gross per 24 hour   Intake                0 ml   Output              800 ml   Net             -800 ml       Invasive Devices     Peripheral Intravenous Line            Peripheral IV 08/27/18 Left Forearm less than 1 day                Physical Exam   Constitutional: He is oriented to person, place, and time  He appears well-developed and well-nourished  No distress  HENT:   Head: Normocephalic and atraumatic  Eyes: Pupils are equal, round, and reactive to light  Neck: Normal range of motion  Neck supple  No JVD present  No thyromegaly present  Cardiovascular: Normal rate, regular rhythm, normal heart sounds and intact distal pulses  Exam reveals no gallop and no friction rub  No murmur heard  Pulmonary/Chest: Effort normal and breath sounds normal  No respiratory distress  He has no wheezes  He has no rales  Abdominal: Soft  Bowel sounds are normal  He exhibits no distension  There is no tenderness  There is no rebound  Musculoskeletal: Normal range of motion  He exhibits no edema, tenderness or deformity  Neurological: He is alert and oriented to person, place, and time  Skin: Skin is warm and dry  He is not diaphoretic  Psychiatric: He has a normal mood and affect  Nursing note and vitals reviewed  Lab Results:   I have personally reviewed pertinent lab results      CBC with diff:   Results from last 7 days  Lab Units 08/28/18  0551   WBC Thousand/uL 15 99*   RBC Million/uL 4 52   HEMOGLOBIN g/dL 12 7   HEMATOCRIT % 39 7   MCV fL 88 MCH pg 28 1   MCHC g/dL 32 0   RDW % 14 3   MPV fL 9 7   PLATELETS Thousands/uL 284     CMP:   Results from last 7 days  Lab Units 08/28/18  0551 08/27/18  2347   SODIUM mmol/L 140 138   POTASSIUM mmol/L 3 8 3 6   CHLORIDE mmol/L 103 102   CO2 mmol/L 32 31   BUN mg/dL 9 11   CREATININE mg/dL 0 83 0 88   CALCIUM mg/dL 9 1 8 8   AST U/L  --  15   ALT U/L  --  19   ALK PHOS U/L  --  66   EGFR ml/min/1 73sq m 105 102     Troponin:   0  Lab Value Date/Time   TROPONINI <0 02 08/28/2018 0551   TROPONINI <0 02 08/28/2018 0117   TROPONINI <0 02 08/27/2018 2347     BNP:   Results from last 7 days  Lab Units 08/28/18  0551   SODIUM mmol/L 140   POTASSIUM mmol/L 3 8   CHLORIDE mmol/L 103   CO2 mmol/L 32   BUN mg/dL 9   CREATININE mg/dL 0 83   CALCIUM mg/dL 9 1   EGFR ml/min/1 73sq m 105     Coags:     TSH:     Magnesium:   Results from last 7 days  Lab Units 08/27/18  2347   MAGNESIUM mg/dL 2 0     Lipid Profile:     Imaging: I have personally reviewed pertinent reports  CT BRAIN - WITHOUT CONTRAST     INDICATION:   Syncope/fainting      COMPARISON:  None      TECHNIQUE:  CT examination of the brain was performed  In addition to axial images, coronal 2D reformatted images were created and submitted for interpretation        Radiation dose length product (DLP) for this visit:  1242 11 mGy-cm   This examination, like all CT scans performed in the [facility], was performed utilizing techniques to minimize radiation dose exposure, including the use of   iterative reconstruction and automated exposure control        IMAGE QUALITY:  Diagnostic      FINDINGS:     PARENCHYMA:  No intracranial mass, mass effect or midline shift  No CT signs of acute infarction  No acute parenchymal hemorrhage      VENTRICLES AND EXTRA-AXIAL SPACES:  No hydrocephalus or extra-axial collection      VISUALIZED ORBITS AND PARANASAL SINUSES:  No retrobulbar hematoma    No paranasal sinus disease      CALVARIUM AND EXTRACRANIAL SOFT TISSUES:  No acute calvarial fracture, lytic or blastic lesion or soft tissue mass      IMPRESSION:     No acute intracranial abnormality                  Workstation performed: [id number]    ------------------------------------------------------------------------------------------------------------------------  CHEST      INDICATION:   chest pain      COMPARISON:  None     EXAM PERFORMED/VIEWS:  XR CHEST PA & LATERAL        FINDINGS: Apices not in the field-of-view      Cardiomediastinal silhouette appears unremarkable      The lungs are clear  No pneumothorax or pleural effusion      Osseous structures appear within normal limits for patient age      IMPRESSION:  Apices poorly visualized  No acute cardiopulmonary disease            Workstation performed: [id number]    ------------------------------------------------------------------------------------------------------------  CTA NECK AND BRAIN WITH AND WITHOUT CONTRAST     INDICATION: Headache, sudden, carotid/vertebral dissection suspected     COMPARISON:   None      TECHNIQUE:  Routine CT imaging of the Brain without contrast   Post contrast imaging was performed after administration of iodinated contrast through the neck and brain  Post contrast axial 0 625 mm images timed to opacify the arterial system        3D rendering was performed on an independent workstation  MIP reconstructions performed  Coronal reconstructions were performed of the noncontrast portion of the brain        Radiation dose length product (DLP) for this visit:  420 42 mGy-cm     This examination, like all CT scans performed in the [facility], was performed utilizing techniques to minimize radiation dose exposure, including the use of iterative   reconstruction and automated exposure control         IV Contrast:  85 mL of iohexol (OMNIPAQUE)     IMAGE QUALITY:   Diagnostic     FINDINGS:  Noncontrast CT head was performed prior to this examination and is reported separately     CERVICAL VASCULATURE     AORTIC ARCH AND GREAT VESSELS:  Normal aortic arch and great vessel origins  Normal visualized subclavian vessels      RIGHT VERTEBRAL ARTERY CERVICAL SEGMENT:  Mild atherosclerotic plaque at the origin  The vessel is normal in caliber throughout the neck      LEFT VERTEBRAL ARTERY CERVICAL SEGMENT:  Left vertebral artery takes off from the aortic arch  The vessel is normal in caliber throughout the neck      RIGHT EXTRACRANIAL CAROTID SEGMENT:  Normal caliber common carotid artery  Normal bifurcation and cervical internal carotid artery  No stenosis or dissection      LEFT EXTRACRANIAL CAROTID SEGMENT:  Normal caliber common carotid artery  Normal bifurcation and cervical internal carotid artery  No stenosis or dissection      NASCET criteria was used to determine the degree of internal carotid artery diameter stenosis        INTRACRANIAL VASCULATURE      INTERNAL CAROTID ARTERIES:  Normal enhancement of the intracranial portions of the internal carotid arteries  Normal ophthalmic artery origins  Normal ICA terminus       ANTERIOR CIRCULATION:  Symmetric A1 segments and anterior cerebral arteries with normal enhancement  Normal anterior communicating artery      MIDDLE CEREBRAL ARTERY CIRCULATION:  M1 segment and middle cerebral artery branches demonstrate normal enhancement bilaterally      DISTAL VERTEBRAL ARTERIES:  Normal distal vertebral arteries  Posterior inferior cerebellar artery origins are normal  Normal vertebral basilar junction      BASILAR ARTERY:  Basilar artery is normal in caliber  Normal superior cerebellar arteries      POSTERIOR CEREBRAL ARTERIES: Both posterior cerebral arteries arises from the basilar tip  Both arteries demonstrate normal enhancement     Normal posterior communicating arteries      DURAL VENOUS SINUSES:  Normal         NON VASCULAR ANATOMY     BONY STRUCTURES:  No acute osseous abnormality      SOFT TISSUES OF THE NECK:  Mild adenoidal hypertrophy is noted, likely secondary to infectious or inflammatory etiology      THORACIC INLET:  4 mm intrafissural node is incidentally noted at the right upper lobe        IMPRESSION:     No evidence of large aneurysm, dissection, significant stenosis or large vessel occlusion throughout the head and neck                       Workstation performed: [id number]    -------------------------------------------------------------------------------------------------------------------------    EKG:   Sinus rhythm  VTE Prophylaxis: Sequential compression device (Venodyne)     Code Status: Level 1 - Full Code  Advance Directive and Living Will:      Power of :    POLST:      Counseling / Coordination of Care  Total floor / unit time spent today 60 minutes  Greater than 50% of total time was spent with the patient and / or family counseling and / or coordination of care  A description of the counseling / coordination of care:   Syncope

## 2018-08-28 NOTE — NURSING NOTE
Patient discharged in stable condition, left the floor alone, walking with a steady gait  Discharge instructions reviewed and understood by patient  Pt was given opportunity to ask questions

## 2018-08-28 NOTE — ED PROVIDER NOTES
History  Chief Complaint   Patient presents with    Weakness - Generalized     PAtient rpeorts generalized weakness and dizziness X 3days  Patient reports that he had a syncopal episode on saturday, patient states that he felt the same way yesterday he sat on the floor "and I went limp but no LOC " Patient states he started to feel the same way today so he decided to come to the ER  79-year-old black male presents with complaints of generalized weakness and dizziness intermittently for several days  Patient states he has had several known near syncopal episodes  Patient states his body laterally goes limp  No generalized tonic colonic movements noted, no incontinence, no tongue biting, no history of seizures, patient denies fever, no trauma, no palpitations, no chest pain, no shortness of breath, no LOC  Patient denies drugs or alcohol  History provided by:  Patient  Malaise - 7 years or greater   Severity:  Moderate  Onset quality:  Sudden  Duration:  1 hour  Timing:  Intermittent  Progression:  Improving  Chronicity:  Recurrent  Context: alcohol use, drug use, recent infection, stress and urinary tract infection    Context: not allergies, not change in medication, not decreased sleep and not dehydration    Relieved by:  Rest and lying down  Worsened by:  Nothing  Associated symptoms: fever and headaches    Associated symptoms: no abdominal pain, no aphasia, no arthralgias, no ataxia, no cough, no diarrhea, no dizziness, no dysuria, no numbness in extremities, no falls, no hematochezia, no lethargy, no myalgias, no nausea, no near-syncope, no shortness of breath, no syncope, no urgency, no vision change and no vomiting    Risk factors: no coronary artery disease and no heart disease    Risk factors comment:  Borderline diabetic not currently on any medication        None       Past Medical History:   Diagnosis Date    Diabetes mellitus ([facility] [address] )        Past Surgical History:   Procedure Laterality Date    TONSILLECTOMY         History reviewed  No pertinent family history  I have reviewed and agree with the history as documented  Social History   Substance Use Topics    Smoking status: Current Every Day Smoker     Packs/day: 0 50    Smokeless tobacco: Never Used    Alcohol use No        Review of Systems   Constitutional: Positive for fever  Negative for chills  HENT: Negative  Eyes: Negative for photophobia  Respiratory: Negative  Negative for cough, choking, chest tightness, shortness of breath, wheezing and stridor  Cardiovascular: Negative for syncope and near-syncope  Gastrointestinal: Negative for abdominal pain, diarrhea, hematochezia, nausea and vomiting  Genitourinary: Negative for dysuria and urgency  Musculoskeletal: Negative for arthralgias, falls and myalgias  Skin: Negative  Neurological: Positive for headaches  Negative for dizziness  Hematological: Negative  Psychiatric/Behavioral: Negative  All other systems reviewed and are negative  Physical Exam  Physical Exam   Constitutional: He is oriented to person, place, and time  He appears well-developed and well-nourished  HENT:   Head: Normocephalic and atraumatic  Right Ear: External ear normal    Left Ear: External ear normal    Nose: Nose normal    Mouth/Throat: Oropharynx is clear and moist    Eyes: Conjunctivae and EOM are normal    Neck: Normal range of motion  Neck supple  Cardiovascular: Normal rate, regular rhythm, normal heart sounds and intact distal pulses  Pulmonary/Chest: Effort normal and breath sounds normal  No respiratory distress  He has no wheezes  He exhibits no tenderness  Abdominal: Soft  Bowel sounds are normal    Musculoskeletal: Normal range of motion  Neurological: He is alert and oriented to person, place, and time  Speech slightly thick however patient states that is his baseline voice   Skin: Skin is warm and dry  Capillary refill takes less than 2 seconds   No rash noted  No erythema  Psychiatric: He has a normal mood and affect  His behavior is normal  Judgment and thought content normal    Nursing note and vitals reviewed  Vital Signs  ED Triage Vitals [08/27/18 2341]   Temperature Pulse Respirations Blood Pressure SpO2   98 1 °F (36 7 °C) 70 18 129/71 98 %      Temp Source Heart Rate Source Patient Position - Orthostatic VS BP Location FiO2 (%)   Oral Monitor Lying Right arm --      Pain Score       No Pain           Vitals:    08/28/18 0030 08/28/18 0045 08/28/18 0133 08/28/18 0153   BP:   121/74 110/64   Pulse: 70 66 62 66   Patient Position - Orthostatic VS:   Lying Sitting       Visual Acuity  Visual Acuity      Most Recent Value   L Pupil Size (mm)  2   R Pupil Size (mm)  2          ED Medications  Medications   nicotine (NICODERM CQ) 7 mg/24hr TD 24 hr patch 1 patch (not administered)   heparin (porcine) subcutaneous injection 5,000 Units (not administered)   sodium chloride 0 9 % infusion (50 mL/hr Intravenous New Bag 8/28/18 0211)   iohexol (OMNIPAQUE) 350 MG/ML injection (MULTI-DOSE) 85 mL (85 mL Intravenous Given 8/28/18 0136)       Diagnostic Studies  Results Reviewed     Procedure Component Value Units Date/Time    Rapid drug screen, urine [[id number]]  (Abnormal) Collected:  08/28/18 0142    Lab Status:  Final result Specimen:  Urine from Urine, Clean Catch Updated:  08/28/18 0207     Amph/Meth UR Negative     Barbiturate Ur Negative     Benzodiazepine Urine Negative     Cocaine Urine Negative     Methadone Urine Negative     Opiate Urine Negative     PCP Ur Negative     THC Urine Positive (A)    Narrative:         Presumptive report  If requested, specimen will be sent to reference lab for confirmation  FOR MEDICAL PURPOSES ONLY  IF CONFIRMATION NEEDED PLEASE CONTACT THE LAB WITHIN 5 DAYS      Drug Screen Cutoff Levels:  AMPHETAMINE/METHAMPHETAMINES  1000 ng/mL  BARBITURATES     200 ng/mL  BENZODIAZEPINES     200 ng/mL  COCAINE      300 ng/mL  METHADONE      300 ng/mL  OPIATES      300 ng/mL  PHENCYCLIDINE     25 ng/mL  THC       50 ng/mL    Platelet count [[id number]]     Lab Status:  No result Specimen:  Blood     Troponin I [[id number]]     Lab Status:  No result Specimen:  Blood     UA w Reflex to Microscopic w Reflex to Culture [[id number]] Collected:  08/28/18 0143    Lab Status:  Final result Specimen:  Urine from Urine, Clean Catch Updated:  08/28/18 0153     Color, UA Yellow     Clarity, UA Clear     Specific [place], UA 1 020     pH, UA 6 5     Leukocytes, UA Negative     Nitrite, UA Negative     Protein, UA Negative mg/dl      Glucose, UA Negative mg/dl      Ketones, UA Negative mg/dl      Urobilinogen, UA 0 2 E U /dl      Bilirubin, UA Negative     Blood, UA Negative    Troponin I [[id number]]  (Normal) Collected:  08/28/18 0117    Lab Status:  Final result Specimen:  Blood from Arm, Left Updated:  08/28/18 0142     Troponin I <0 02 ng/mL     Ethanol [[id number]]  (Normal) Collected:  08/28/18 0117    Lab Status:  Final result Specimen:  Blood from Arm, Left Updated:  08/28/18 0138     Ethanol Lvl <3 mg/dL     Stool Enteric Bacterial Panel by PCR [[id number]]     Lab Status:  No result Specimen:  Stool     Ova and parasite examination [[id number]]     Lab Status:  No result Specimen:  Stool     Fecal leukocytes [[id number]]     Lab Status:  No result Specimen:  Stool     Magnesium [[id number]]  (Normal) Collected:  08/27/18 2347    Lab Status:  Final result Specimen:  Blood from Arm, Left Updated:  08/28/18 0126     Magnesium 2 0 mg/dL     Phosphorus [[id number]]  (Normal) Collected:  08/27/18 2347    Lab Status:  Final result Specimen:  Blood from Arm, Left Updated:  08/28/18 0126     Phosphorus 4 4 mg/dL     CK Total with Reflex CKMB [[id number]]  (Normal) Collected:  08/27/18 2347    Lab Status:  Final result Specimen:  Blood from Arm, Left Updated:  08/28/18 0126     Total CK 78 U/L     Lactate dehydrogenase, isoenzymes [[id number]] Collected:  08/27/18 2347    Lab Status: In process Specimen:  Blood from Arm, Left Updated:  08/28/18 0122    Troponin I [[id number]]  (Normal) Collected:  08/27/18 2347    Lab Status:  Final result Specimen:  Blood from Arm, Left Updated:  08/28/18 0014     Troponin I <0 02 ng/mL     Comprehensive metabolic panel [[id number]] Collected:  08/27/18 2347    Lab Status:  Final result Specimen:  Blood from Arm, Left Updated:  08/28/18 0009     Sodium 138 mmol/L      Potassium 3 6 mmol/L      Chloride 102 mmol/L      CO2 31 mmol/L      ANION GAP 5 mmol/L      BUN 11 mg/dL      Creatinine 0 88 mg/dL      Glucose 87 mg/dL      Calcium 8 8 mg/dL      AST 15 U/L      ALT 19 U/L      Alkaline Phosphatase 66 U/L      Total Protein 7 2 g/dL      Albumin 3 7 g/dL      Total Bilirubin 0 30 mg/dL      eGFR 102 ml/min/1 73sq m     Narrative:         National Kidney Disease Education Program recommendations are as follows:  GFR calculation is accurate only with a steady state creatinine  Chronic Kidney disease less than 60 ml/min/1 73 sq  meters  Kidney failure less than 15 ml/min/1 73 sq  meters      CBC and differential [[id number]]  (Abnormal) Collected:  08/27/18 2347    Lab Status:  Final result Specimen:  Blood from Arm, Left Updated:  08/27/18 2359     WBC 15 95 (H) Thousand/uL      RBC 4 45 Million/uL      Hemoglobin 12 7 g/dL      Hematocrit 38 7 %      MCV 87 fL      MCH 28 5 pg      MCHC 32 8 g/dL      RDW 14 1 %      MPV 9 5 fL      Platelets 540 Thousands/uL      nRBC 0 /100 WBCs      Neutrophils Relative 66 %      Immat GRANS % 0 %      Lymphocytes Relative 25 %      Monocytes Relative 7 %      Eosinophils Relative 2 %      Basophils Relative 0 %      Neutrophils Absolute 10 49 (H) Thousands/µL      Immature Grans Absolute 0 06 Thousand/uL      Lymphocytes Absolute 3 96 Thousands/µL      Monocytes Absolute 1 07 Thousand/µL      Eosinophils Absolute 0 32 Thousand/µL      Basophils Absolute 0 05 Thousands/µL     Fingerstick Glucose (POCT) [[id number]]  (Normal) Collected:  08/27/18 2340    Lab Status:  Final result Updated:  08/27/18 2342     POC Glucose 85 mg/dl                  CTA head and neck with and without contrast   Final Result by Thomas Kanner, MD (08/28 0210)      No evidence of large aneurysm, dissection, significant stenosis or large vessel occlusion throughout the head and neck                        Workstation performed: [id number]         CT head without contrast   Final Result by Olegario Homans, MD (08/28 0024)      No acute intracranial abnormality                    Workstation performed: [id number]         X-ray chest 2 views    (Results Pending)              Procedures  ECG 12 Lead Documentation  Date/Time: 8/27/2018 11:41 PM  Performed by: Stefanie Frances  Authorized by: Stefanie Frances     ECG reviewed by me, the ED Provider: yes    Patient location:  ED  Previous ECG:     Comparison to cardiac monitor: Yes    Interpretation:     Interpretation: normal    Rate:     ECG rate:  70    ECG rate assessment: normal    Rhythm:     Rhythm: sinus rhythm and A-V block    Ectopy:     Ectopy: none    QRS:     QRS axis:  Normal    QRS intervals:  Normal  Conduction:     Conduction: normal    ST segments:     ST segments:  Normal  T waves:     T waves: normal             Phone Contacts  ED Phone Contact    ED Course                   Stroke Assessment     Row Name 08/28/18 0054             NIH Stroke Scale    Interval Baseline      Level of Consciousness (1a ) 0      LOC Questions (1b ) 0      LOC Commands (1c ) 0      Best Gaze (2 ) 0      Visual (3 ) 0      Facial Palsy (4 ) 0      Motor Arm, Left (5a ) 0      Motor Arm, Right (5b ) 0      Motor Leg, Left (6a ) 0      Motor Leg, Right (6b ) 0      Limb Ataxia (7 ) 0      Sensory (8 ) 0      Best Language (9 ) 0      Dysarthria (10 )    Speech      Extinction and Inattention (11 ) (Formerly Neglect) 0      Total                            MDM  CritCare Time    Disposition  Final diagnoses:   Near syncope   Generalized weakness     Time reflects when diagnosis was documented in both MDM as applicable and the Disposition within this note     Time User Action Codes Description Comment    8/28/2018 12:58 AM Clarissa Andrade [R55] Near syncope     8/28/2018 12:59 AM Clarissa Andrade [R53 1] Generalized weakness       ED Disposition     ED Disposition Condition Comment    Admit  Case was discussed with Dr Day Otoole and the patient's admission status was agreed to be Admission Status: observation status to the service of Dr Day Otoole   Follow-up Information    None         There are no discharge medications for this patient  No discharge procedures on file      ED Provider  Electronically Signed by           Agusto Flood MD  08/28/18 6471

## 2018-08-29 LAB
LDH SERPL-CCNC: 190 IU/L (ref 121–224)
LDH1 CFR SERPL ELPH: 26 % (ref 17–32)
LDH2 CFR SERPL ELPH: 35 % (ref 25–40)
LDH3 CFR SERPL ELPH: 22 % (ref 17–27)
LDH4 CFR SERPL ELPH: 8 % (ref 5–13)
LDH5 CFR SERPL ELPH: 9 % (ref 4–20)
MRSA NOSE QL CULT: NORMAL

## 2018-08-30 NOTE — CASE MANAGEMENT
Initial Clinical Review    Admission: Date/Time/Statement: 8/28/18 0100    Orders Placed This Encounter   Procedures    Place in Observation (expected length of stay for this patient is less than two midnights)     Standing Status:   Standing     Number of Occurrences:   1     Order Specific Question:   Admitting Physician     Answer:   Ketty Figueroa [[id number]]     Order Specific Question:   Level of Care     Answer:   Med Surg [16]         ED: Date/Time/Mode of Arrival:   ED Arrival Information     Expected Arrival Acuity Means of Arrival Escorted By Service Admission Type    - 8/27/2018 23:30 Urgent [address] Urgent    Arrival Complaint    LIGHT HEADED,DRY MOUTH,PAST OUT          Chief Complaint:   Chief Complaint   Patient presents with    Weakness - Generalized     PAtient rpeorts generalized weakness and dizziness X 3days  Patient reports that he had a syncopal episode on saturday, patient states that he felt the same way yesterday he sat on the floor "and I went limp but no LOC " Patient states he started to feel the same way today so he decided to come to the ER  History of Illness: Karina Roberts is a 53 y o  male with a PMH of DM who presents with dizziness, abdominal pain and diarrhea  He states that he was in his usual state of health until 2 days ago when he noticed some dizziness and nausea  This became more severe and he wound up falling down but not losing consciousness  He states that his body "just gave out"  This has never happened before  This was witnessed by his family who denied any seizure-like activity  Afterwards he regained his strength however he began to have midabdominal discomfort and loose stools  He also continued to have intermittent dizziness  Today he states that the dizziness became much worse and almost passed out  He denies any focal weakness/numbness/tingling, slurred speech or difficulty talking    Currently he reports mild abdominal discomfort  No other complaints or concerns  ED Vital Signs:   ED Triage Vitals [08/27/18 2341]   Temperature Pulse Respirations Blood Pressure SpO2   98 1 °F (36 7 °C) 70 18 129/71 98 %      Temp Source Heart Rate Source Patient Position - Orthostatic VS BP Location FiO2 (%)   Oral Monitor Lying Right arm --      Pain Score       No Pain        Wt Readings from Last 1 Encounters:   08/28/18 77 9 kg (171 lb 11 8 oz)       Vital Signs (abnormal): Abnormal Labs/Diagnostic Test Results: wbc 15 99 urine + thc  Ct head No acute intracranial abnormality  cta head neck  No evidence of large aneurysm, dissection, significant stenosis or large vessel occlusion throughout the head and neck   echo ef 55-60%  ED Treatment:   Medication Administration from 08/27/2018 2330 to 08/28/2018 0153       Date/Time Order Dose Route Action Action by Comments     08/28/2018 0136 iohexol (OMNIPAQUE) 350 MG/ML injection (MULTI-DOSE) 85 mL 85 mL Intravenous Given Carolina Barksdale           Past Medical/Surgical History: Active Ambulatory Problems     Diagnosis Date Noted    No Active Ambulatory Problems     Resolved Ambulatory Problems     Diagnosis Date Noted    No Resolved Ambulatory Problems     Past Medical History:   Diagnosis Date    Diabetes mellitus ([facility] [address] )        Admitting Diagnosis: Near syncope [R55]  Generalized weakness [R53 1]    Age/Sex: 53 y o  male    Assessment/Plan:   Near syncope   Assessment & Plan     CT head is negative for an acute process  Can be secondary to dehydration from volume loss/diarrhea though initial symptoms began prior to onset but have persisted  Will admit for Observation, monitor on telemetry, trend troponin, check Orthostatics, order TTE and have Cardiology and Neurology evaluate the pt           Viral gastroenteritis   Assessment & Plan     Likely secondary to viral gastroenteritis as he reports mild abdominal discomfort, diarrhea and recent sick contact with similar complaints    Will order stool studies and hydrate gently with IV NS          Leukocytosis   Assessment & Plan     Likely due to above  Will repeat CBC later today           DM (diabetes mellitus) ([facility] [address] )   Assessment & Plan     No results found for: HGBA1C         Recent Labs      08/27/18   2340   POCGLU  85         Blood Sugar Average: Last 72 hrs:  (P) 85      Will place on [facility]             Hospital Problem List:  Principal Problem:    Near syncope  Active Problems:    Viral gastroenteritis    Leukocytosis    DM (diabetes mellitus) ([facility] [address]   VTE Prophylaxis: Heparin   Code Status: No Orde  Anticipated Length of Stay:  Patient will be admitted on an Observation basis with an anticipated length of stay of atleast 1 midnights  Neuro consult  Assessment/Plan  1  Syncope   2  Dehydration   3  Viral gastroenteritis   Most likely etiology vasovagal from volume depletion in setting of current GI illness and poor intake  Discussed staying hydrated with fluids and electrolytes  His condition should be self limited  Continue IV hydration  If his sxs recur, he is asked to wear compressive stockings       Cardiology   Syncope, no clear etiology  Most likely vasovagal made worse with viral gastroenteritis  Continue gentle hydration  Continue monitoring  Prelim very echo shows normal LV systolic function no significant valvular disease and no cardiac arrhythmia noted so far  2   Continues tobacco abuse  3  History of marijuana use but denies any drug abuse  4  Possible viral gastroenteritis  5  Elevated white count     Plan  Echo Doppler  Gentle hydration  ACS already ruled out  Continue monitoring  Exercise stress test  Neuro consult already in progress  Check for orthostatics  Advised to keep himself hydrated  Quit smoking  Check fasting lipids LFTs and TSH for assessment of his cardiac risk  Discussed with patient at length    Stress testing  IMPRESSIONS: Normal study after maximal exercise without reproduction of symptoms  Diagnostic sensitivity was limited by submaximal stress      Pt d/c home    Admission Orders:  Observation tele mon  consult neurology   Consult cardiology  Stress test  Serial troponin  Echo  Orthostatic bp  Stool studies  Scheduled Meds:   Continuous Infusions:   No current facility-administered medications for this encounter     PRN Meds:

## 2018-10-15 NOTE — ASSESSMENT & PLAN NOTE
CT head is negative for an acute process     Likely secondary secondary to vasovagal in setting of volume depletion  Noted to have low normal blood pressure  No orthostasis at the time of admission, but negative subsequently by after IV hydration  CTA head and neck without any acute abnormality  2D echo with normal EF without significant valvular abnormality  Telemetry is without any arrhythmia  Symptomatic improved  Tolerating diet  Neuro exam nonfocal, abdominal exam benign  Advised adequate p o  intake and hydration  Pineda stockings  Follow-up pending TSH and lipid profile  Neurology and Cardiology input appreciated
Likely reactive related to gastroenteritis   Patient is afebrile hemodynamically stable  Abdominal exam is benign  Tolerating diet  No further nausea vomiting or diarrhea  Advised to monitor symptoms  Advised to follow up with PMD in 1-2 weeks for repeat CBC  Advised to seek medical attention if recurrence of symptoms
Likely secondary to viral gastroenteritis as he reports mild abdominal discomfort, diarrhea and recent sick contact with similar complaints    Will order stool studies and hydrate gently with IV NS
No results found for: HGBA1C    Recent Labs      08/27/18   2340  08/28/18   0710  08/28/18   1142   POCGLU  85  95  95       Blood Sugar Average: Last 72 hrs:  (P) 85     Acceptable  Follow-up PMD
Possibly secondary to gastroenteritis as he reports mild abdominal discomfort, diarrhea and recent sick contact with similar complaints     Clinically improving  No further nausea vomiting or abdominal discomfort  Tolerating diet  Abdominal exam benign   monitor clinically, supportive care  Advised to seek medical attention if recurrence of GI symptoms
n/a